# Patient Record
Sex: MALE | Race: WHITE | NOT HISPANIC OR LATINO | URBAN - METROPOLITAN AREA
[De-identification: names, ages, dates, MRNs, and addresses within clinical notes are randomized per-mention and may not be internally consistent; named-entity substitution may affect disease eponyms.]

---

## 2020-12-27 ENCOUNTER — EMERGENCY (EMERGENCY)
Facility: HOSPITAL | Age: 55
LOS: 1 days | Discharge: HOME | End: 2020-12-27
Attending: EMERGENCY MEDICINE | Admitting: EMERGENCY MEDICINE
Payer: MEDICAID

## 2020-12-27 VITALS
HEIGHT: 72 IN | SYSTOLIC BLOOD PRESSURE: 177 MMHG | RESPIRATION RATE: 18 BRPM | OXYGEN SATURATION: 97 % | HEART RATE: 89 BPM | TEMPERATURE: 98 F | WEIGHT: 240.08 LBS | DIASTOLIC BLOOD PRESSURE: 92 MMHG

## 2020-12-27 PROCEDURE — 99283 EMERGENCY DEPT VISIT LOW MDM: CPT | Mod: 25

## 2020-12-27 PROCEDURE — 99283 EMERGENCY DEPT VISIT LOW MDM: CPT

## 2020-12-27 PROCEDURE — 96372 THER/PROPH/DIAG INJ SC/IM: CPT

## 2020-12-27 RX ORDER — BACLOFEN 100 %
1 POWDER (GRAM) MISCELLANEOUS
Qty: 15 | Refills: 0
Start: 2020-12-27 | End: 2020-12-31

## 2020-12-27 RX ORDER — BACLOFEN 100 %
10 POWDER (GRAM) MISCELLANEOUS ONCE
Refills: 0 | Status: COMPLETED | OUTPATIENT
Start: 2020-12-27 | End: 2020-12-27

## 2020-12-27 RX ORDER — LIDOCAINE 4 G/100G
1 CREAM TOPICAL ONCE
Refills: 0 | Status: COMPLETED | OUTPATIENT
Start: 2020-12-27 | End: 2020-12-27

## 2020-12-27 RX ORDER — KETOROLAC TROMETHAMINE 30 MG/ML
60 SYRINGE (ML) INJECTION ONCE
Refills: 0 | Status: DISCONTINUED | OUTPATIENT
Start: 2020-12-27 | End: 2020-12-27

## 2020-12-27 RX ADMIN — Medication 60 MILLIGRAM(S): at 15:50

## 2020-12-27 RX ADMIN — Medication 60 MILLIGRAM(S): at 16:10

## 2020-12-27 RX ADMIN — Medication 10 MILLIGRAM(S): at 16:00

## 2020-12-27 RX ADMIN — Medication 125 MILLIGRAM(S): at 16:00

## 2020-12-27 RX ADMIN — LIDOCAINE 1 PATCH: 4 CREAM TOPICAL at 15:50

## 2020-12-27 NOTE — ED PROVIDER NOTE - PROVIDER TOKENS
PROVIDER:[TOKEN:[[id number]],FOLLOWUP:[1-3 Days]],PROVIDER:[TOKEN:[[id number]],FOLLOWUP:[1-3 Days]],PROVIDER:[TOKEN:[[id number]],FOLLOWUP:[1-3 Days]]

## 2020-12-27 NOTE — ED PROVIDER NOTE - PATIENT PORTAL LINK FT
You can access the FollowMyHealth Patient Portal offered by [facility] by registering at the following website: [email or web address]. By joining [employer]’s FollowMyHealth portal, you will also be able to view your health information using other applications (apps) compatible with our system.

## 2020-12-27 NOTE — ED PROVIDER NOTE - OBJECTIVE STATEMENT
pt with hx chronic low back pain, used to follow with pain mgmt for epidurals, but no longer able to see due to insurance change c/o exacerbation of his chronic low back pain since 12/23. no trauma, fevers, chills, weakness, numbness incontinence, dysuria, hematuria, freq, abd pain or any other complaints.  pmd - gigante  pain mgmt - used to see estefania, but cant due to new insurance  ortho - marchelseyc pt with hx chronic low back pain, used to follow with pain mgmt for epidurals, but no longer able to see due to insurance change c/o exacerbation of his chronic low back pain since 12/23. no trauma, fevers, chills, weakness, numbness incontinence, dysuria, hematuria, freq, abd pain or any other complaints. pt relates when gets exacerbations, usually imrpvoes with solumedrol im, toradol 60 im, then baclofen 10 tid and prednisone 50 qd. pt requesting this cocktail only since that is what works for him, refusing imaging.  pmd - [facility]  pain mgmt - used to see estefania, but cant due to new insurance  ortho - ramone pt with hx chronic low back pain, used to follow with pain mgmt for epidurals, but no longer able to see due to insurance change c/o exacerbation of his chronic low back pain since 12/23. no trauma, fevers, chills, weakness, numbness incontinence, dysuria, hematuria, freq, abd pain, weight loss, ca hx or any other complaints. pt relates when gets exacerbations, usually imrpvoes with solumedrol im, toradol 60 im, then baclofen 10 tid and prednisone 50 qd. pt requesting this cocktail only since that is what works for him, refusing imaging.  pmd - [facility]ante  pain mgmt - used to see estefania, but cant due to new insurance  ortho - ramone

## 2020-12-27 NOTE — ED ADULT NURSE NOTE - OBJECTIVE STATEMENT
Pt A&Ox4, ambulatory to ED c/o back pain.  Pt states he has chronic back pain but recently lost access to his pain mgmt physician due to insurance change.  Over the past few weeks pain has increased and he does not have enough medication.  Pt states his PMD gave him advice of specific cocktail of toradol, solumedrol, baclofen to help with pain.

## 2020-12-27 NOTE — ED ADULT NURSE NOTE - PMH
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

## 2020-12-27 NOTE — ED PROVIDER NOTE - CARE PROVIDERS DIRECT ADDRESSES
,DirectAddress_Unknown,DirectAddress_Unknown,[email or web address]

## 2020-12-27 NOTE — ED PROVIDER NOTE - CARE PROVIDER_API CALL
Clayton Rzivi)  Orthopaedic Surgery  [address], [address]  [place] [address]  Phone: [phone]  Fax: [phone]  Follow Up Time: 1-3 Days    Nadir Guajardo  ANESTHESIOLOGY  [address]  [place] [address]  Phone: [phone]  Fax: [phone]  Follow Up Time: 1-3 Days    [place] [address]  Phone: [phone]  Fax: [phone]  Follow Up Time: 1-3 Days

## 2020-12-27 NOTE — ED PROVIDER NOTE - CLINICAL SUMMARY MEDICAL DECISION MAKING FREE TEXT BOX
pt with exacerbation of his chronic low back pain, usually improves with toradol 60mg then rx [id number] tid, prednisone 50 - analgesia/pain mgmt referral pt with exacerbation of his chronic low back pain, usually improves with solumedrol im, toradol 60mg im then rx [id number] tid, prednisone 50 - analgesia/pain mgmt referral pt with exacerbation of his chronic low back pain, usually improves with solumedrol im, toradol 60mg im then rx [id number] tid, prednisone 50, no red flag symptoms - analgesia/pain mgmt referral

## 2021-10-23 ENCOUNTER — EMERGENCY (EMERGENCY)
Facility: HOSPITAL | Age: 56
LOS: 1 days | Discharge: HOME | End: 2021-10-23
Attending: EMERGENCY MEDICINE | Admitting: EMERGENCY MEDICINE
Payer: MEDICAID

## 2021-10-23 VITALS
HEART RATE: 90 BPM | RESPIRATION RATE: 18 BRPM | SYSTOLIC BLOOD PRESSURE: 155 MMHG | OXYGEN SATURATION: 99 % | TEMPERATURE: 99 F | DIASTOLIC BLOOD PRESSURE: 100 MMHG | HEIGHT: 72 IN | WEIGHT: 244.93 LBS

## 2021-10-23 VITALS
TEMPERATURE: 98 F | OXYGEN SATURATION: 99 % | DIASTOLIC BLOOD PRESSURE: 90 MMHG | RESPIRATION RATE: 18 BRPM | SYSTOLIC BLOOD PRESSURE: 160 MMHG | HEART RATE: 88 BPM

## 2021-10-23 PROBLEM — M51.24 OTHER INTERVERTEBRAL DISC DISPLACEMENT, THORACIC REGION: Chronic | Status: ACTIVE | Noted: 2020-12-27

## 2021-10-23 PROCEDURE — 96372 THER/PROPH/DIAG INJ SC/IM: CPT

## 2021-10-23 PROCEDURE — 99284 EMERGENCY DEPT VISIT MOD MDM: CPT

## 2021-10-23 PROCEDURE — 99283 EMERGENCY DEPT VISIT LOW MDM: CPT | Mod: 25

## 2021-10-23 RX ORDER — DEXAMETHASONE 0.5 MG/5ML
6 ELIXIR ORAL ONCE
Refills: 0 | Status: COMPLETED | OUTPATIENT
Start: 2021-10-23 | End: 2021-10-23

## 2021-10-23 RX ORDER — KETOROLAC TROMETHAMINE 30 MG/ML
60 SYRINGE (ML) INJECTION ONCE
Refills: 0 | Status: DISCONTINUED | OUTPATIENT
Start: 2021-10-23 | End: 2021-10-23

## 2021-10-23 RX ORDER — KETOROLAC TROMETHAMINE 30 MG/ML
1 SYRINGE (ML) INJECTION
Qty: 20 | Refills: 0
Start: 2021-10-23 | End: 2021-10-27

## 2021-10-23 RX ADMIN — Medication 6 MILLIGRAM(S): at 17:38

## 2021-10-23 RX ADMIN — Medication 60 MILLIGRAM(S): at 17:38

## 2021-10-23 NOTE — ED ADULT NURSE NOTE - NSICDXPASTMEDICALHX_GEN_ALL_CORE_FT
PAST MEDICAL HISTORY:  Arthritis     Carpal tunnel syndrome     Herniated cervical disc     Herniated lumbar intervertebral disc     Herniated thoracic disc without myelopathy     Stenosis of cervical spine     Tendonitis

## 2021-10-23 NOTE — ED PROVIDER NOTE - CARE PROVIDERS DIRECT ADDRESSES
[email or web address]

## 2021-10-23 NOTE — ED PROVIDER NOTE - PATIENT PORTAL LINK FT
You can access the FollowMyHealth Patient Portal offered by [facility] by registering at the following website: [email or web address]. By joining [employer]’s FollowMyHealth portal, you will also be able to view your health information using other applications (apps) compatible with our system.

## 2021-10-23 NOTE — ED PROVIDER NOTE - CARE PROVIDER_API CALL
2018 23:[address] [place] [address]  Phone: [phone]  Fax: [phone]  Follow Up Time:

## 2021-10-23 NOTE — ED PROVIDER NOTE - NEUROLOGICAL, MLM
Alert and oriented, no focal deficits. Steady gait. Strength 5/5 x 4 extremities. Sensation intact throughout and equal bilaterally.

## 2021-10-23 NOTE — ED PROVIDER NOTE - CLINICAL SUMMARY MEDICAL DECISION MAKING FREE TEXT BOX
56 M with flare of chronic neck/back pain - requesting steroids and toradol which has helped with flares in the past - decadron, toradol

## 2021-10-23 NOTE — ED PROVIDER NOTE - PROGRESS NOTE DETAILS
Discussed risks of prolonged NSAID use. Pt states is aware and makes sure to take on full stomach, drinks plenty water, follows regularly with his doctors. Requesting toradol and steroids. Will provide. Strongly encouraged f/u with his surgeon, pain management, PMD. Also continue to monitor his BP and f/u PMD. Verbalized understanding and is agreeable with plan.

## 2021-10-23 NOTE — ED PROVIDER NOTE - ATTENDING CONTRIBUTION TO CARE
55 yo male who has hx of neck and back problems presents with acute exacerbation of chronic pain. he works in construction and states after a particularly difficult week he has greater neck pain and low zion pain than usual no radicular pain no abd pan no fever no weakness numbness or other  askng for toradol decadron and baclofen he has tens lidoderm and other meds at home  on eval  wd wn male sitting on exam bed very comfortable nad  heent nc at mmm perrla no gf r  neck supple full rom neg spurlin neg l'hermetetes there is scant paraspinal trap m discomfort to palp no midlline bony pain  low back pt has r sided paraspinal m spasm no bony pain full rom   neg slr normal motor sensory  plan treat as pt usual meds  on nsaid use and renal gi bone issues  aware he will follow up

## 2021-10-23 NOTE — ED PROVIDER NOTE - OBJECTIVE STATEMENT
55 y/o M with chronic neck/back pain c/o worsening pain to neck, back, left knee. 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 Denies recent fall/trauma/MVA/etc. States follows with surgeon, pain management, PMD. Due for fusion. Reports recently BP has been elevated at times especially when in pain, monitors it at home. Denies fever, chills, HA, dizziness, CP, SOB, abd pain, nausea, vomiting, diarrhea, saddle anesthesia, incontinence or retention of bowel/bladder, focal numbness or weakness, hx IVDU. Treatments he uses: indocin TID (last dose this AM), baclofen TID (last dose this AM), TENS unit, warm showers, ice pack, stretching, topicals (ex lidocaine, voltaren). States in the past has been given toradol and dexamethasone for his symptoms.    PMD: Rony

## 2021-10-23 NOTE — ED PROVIDER NOTE - NSFOLLOWUPINSTRUCTIONS_ED_ALL_ED_FT
Stop indomethacin and take toradol and medrol dose brendan as prescribed. Can continue other treatments.  Follow up with surgeon, pain management for further evaluation and management.  Follow up with your primary care physician for further evaluation of your pain and your blood pressure.  Return to the Emergency Department for worsening or concerning symptoms.    -------------------------------------------------      Chronic Back Pain    WHAT YOU NEED TO KNOW:    What is chronic back pain? Chronic back pain is back pain that lasts 3 months or longer. This may include pain that has not been controlled or does not improve with treatment. Your back pain may cause weakness or pain that spreads to your arms or legs.    What causes or increases my risk for chronic back pain? Conditions that affect the spine, joints, or muscles can cause back pain. These may include arthritis, spinal stenosis (narrowing of the spinal column), muscle tension, or breakdown of the spinal discs. The following increase your risk for back pain:   •Aging      •Lack of regular physical activity       •Repeated bending, lifting, or twisting, or lifting heavy items      •Obesity or pregnancy       •Injury from a fall or accident      •Driving, sitting, or standing for long periods      •Bad posture while sitting or standing      How is chronic back pain diagnosed? Your healthcare provider will ask if you have any medical conditions. He or she may ask if you have a history of back pain and how it started. He or she may watch you stand and walk, and check your range of motion. Show him or her where you feel pain and what makes it better or worse. Describe the pain, how bad it is, and how long it lasts. Tell your provider if your pain worsens at night or when you lie on your back.    How is chronic back pain treated?   •NSAIDs help decrease swelling and pain or fever. This medicine is available with or without a doctor's order. NSAIDs can cause stomach bleeding or kidney problems in certain people. If you take blood thinner medicine, always ask your healthcare provider if NSAIDs are safe for you. Always read the medicine label and follow directions.      •Acetaminophen decreases pain and fever. It is available without a doctor's order. Ask how much to take and how often to take it. Follow directions. Read the labels of all other medicines you are using to see if they also contain acetaminophen, or ask your doctor or pharmacist. Acetaminophen can cause liver damage if not taken correctly. Do not use more than 4 grams (4,000 milligrams) total of acetaminophen in one day.       •Prescription pain medicine called narcotics or opioids may be given for certain types of chronic pain. Ask your healthcare provider how to take this medicine safely.      •Muscle relaxers help decrease pain and muscle spasms.      •Steroids decrease inflammation that causes pain.       •Anesthetic medicines may be injected in or around a nerve to block pain signals from the nerves.      •Antidepressants may be used to help decrease or prevent the symptoms of depression or anxiety. They are also used to treat nerve pain.      How can I manage my symptoms?   •Apply ice for 15 to 20 minutes every hour, or as directed. Use an ice pack, or put crushed ice in a plastic bag. Cover it with a towel before you apply it to your skin. Ice decreases pain and helps prevent tissue damage.      •Apply heat for 20 to 30 minutes every 2 hours, or as directed. Heat helps decrease pain and muscle spasms.      •Use massage to loosen tense muscles. Massage may relieve back pain caused by tight muscles. Regular massages may help prevent this kind of back pain.      •Ask about acupuncture for pain relief. Back pain is sometimes relieved with acupuncture. Talk to your healthcare provider before you get this treatment to make sure it is safe for you.      What else can I do to relieve or prevent back pain?   •Manage stress. Stress can cause back pain or make it worse. Some ways to reduce stress are listening to music, meditating, or using aromatherapy. It may help to talk with a therapist about anything that is causing you stress. Your healthcare provider can give you more information.      •Stay active as much as you can without causing more pain. Ask your healthcare provider what exercises are right for you. Do not sit or lie down for long periods. This could make your back pain worse. Yoga or similar gentle movements may help relieve pain and tension in your back. Go slowly and do not strain your back as you do any movement.      •Be careful when you lift heavy objects. Do not lift anything heavy until your pain is gone. Never strain your back when you lift a heavy item. If possible, ask someone to help you.      •Go to physical therapy as directed. A physical therapist can teach you exercises to help improve movement and strength, and to decrease pain.      When should I call my doctor?   •You have severe pain.      •You have new numbness, tingling, or weakness, especially in your lower back, legs, arms, or genital area.      •You lose control of your bladder or bowel movements.       •You have a fever or sudden weight loss.      •You have new or worse pain.      •You have questions or concerns about your condition or care.      CARE AGREEMENT:    You have the right to help plan your care. Learn about your health condition and how it may be treated. Discuss treatment options with your healthcare providers to decide what care you want to receive. You always have the right to refuse treatment.

## 2021-10-23 NOTE — ED PROVIDER NOTE - EXTREMITY EXAM
FROM x 4 extremities. Left knee with FROM./no deformity, pain or tenderness, no restriction of movement

## 2023-06-13 ENCOUNTER — OFFICE (OUTPATIENT)
Dept: URBAN - METROPOLITAN AREA CLINIC 6 | Facility: CLINIC | Age: 58
Setting detail: OPHTHALMOLOGY
End: 2023-06-13
Payer: COMMERCIAL

## 2023-06-13 DIAGNOSIS — H01.002: ICD-10-CM

## 2023-06-13 DIAGNOSIS — H43.393: ICD-10-CM

## 2023-06-13 DIAGNOSIS — H01.005: ICD-10-CM

## 2023-06-13 DIAGNOSIS — E11.9: ICD-10-CM

## 2023-06-13 DIAGNOSIS — H01.004: ICD-10-CM

## 2023-06-13 DIAGNOSIS — H52.7: ICD-10-CM

## 2023-06-13 DIAGNOSIS — H01.001: ICD-10-CM

## 2023-06-13 PROCEDURE — 92014 COMPRE OPH EXAM EST PT 1/>: CPT | Performed by: OPHTHALMOLOGY

## 2023-06-13 PROCEDURE — 92015 DETERMINE REFRACTIVE STATE: CPT | Performed by: OPHTHALMOLOGY

## 2023-06-13 ASSESSMENT — KERATOMETRY
OS_K1POWER_DIOPTERS: 46.00
OD_K2POWER_DIOPTERS: 47.50
OS_AXISANGLE_DEGREES: 96
OS_K2POWER_DIOPTERS: 47.25
OD_K1POWER_DIOPTERS: 46.00
OD_AXISANGLE_DEGREES: 97

## 2023-06-13 ASSESSMENT — VISUAL ACUITY
OS_BCVA: 20/25-1
OD_BCVA: 20/25

## 2023-06-13 ASSESSMENT — REFRACTION_MANIFEST
OD_CYLINDER: -1.00
OS_SPHERE: PLANO
OD_VA2: 20/20(J1+)
OD_VA2: 20/20(J1+)
OS_VA1: 20/20
OS_AXIS: 005
OS_ADD: +2.25
OS_VA2: 20/20(J1+)
OU_VA: 20/20-
OS_AXIS: 005
OS_SPHERE: PLANO
OS_VA2: 20/20(J1+)
OD_CYLINDER: -1.00
OS_VA1: 20/20
OD_ADD: +2.25
OD_SPHERE: PLANO
OD_VA1: 20/20
OD_SPHERE: PLANO
OS_ADD: +2.00
OD_AXIS: 15
OS_CYLINDER: -1.00
OD_VA1: 20/20
OD_ADD: +2.00
OD_AXIS: 15
OU_VA: 20/20-
OS_CYLINDER: -1.00

## 2023-06-13 ASSESSMENT — AXIALLENGTH_DERIVED
OD_AL: 22.5442
OS_AL: 22.6759

## 2023-06-13 ASSESSMENT — LID EXAM ASSESSMENTS
OD_BLEPHARITIS: RLL RUL 1+
OS_BLEPHARITIS: LLL LUL 1+

## 2023-06-13 ASSESSMENT — REFRACTION_AUTOREFRACTION
OS_AXIS: 007
OD_SPHERE: +0.25
OS_SPHERE: 0.00
OS_CYLINDER: -1.00
OD_CYLINDER: -1.00
OD_AXIS: 17

## 2023-06-13 ASSESSMENT — REFRACTION_CURRENTRX
OS_SPHERE: -0.50
OS_OVR_VA: 20/
OD_VPRISM_DIRECTION: BF
OD_CYLINDER: -1.00
OS_CYLINDER: -2.00
OD_OVR_VA: 20/
OS_AXIS: 005
OD_AXIS: 003
OD_SPHERE: -0.25
OS_VPRISM_DIRECTION: BF

## 2023-06-13 ASSESSMENT — TONOMETRY
OD_IOP_MMHG: 15
OS_IOP_MMHG: 16

## 2023-06-13 ASSESSMENT — SPHEQUIV_DERIVED
OS_SPHEQUIV: -0.5
OD_SPHEQUIV: -0.25

## 2023-06-13 ASSESSMENT — CONFRONTATIONAL VISUAL FIELD TEST (CVF)
OS_FINDINGS: FULL
OD_FINDINGS: FULL

## 2023-07-20 NOTE — ED ADULT TRIAGE NOTE - WEIGHT METHOD
[place] [facility]     Chief Complaint   Patient presents with   â¢ Office Visit   â¢ Foot     Left foot pain. Patient said pain started about 4 weeks ago, no injuries noted. He said the pain was on the side of his foot. Pain started at 9/10 and has eased sense 4/10. 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 PCP: Russ Jamison MD  DLS: 50/[id number]/8075       Subjective:    Conchita Millan presents today with complaint of a painful left foot which has been present for weeks. They direct their attention directed to the outside of the left foot. Patient has not experienced a similar condition in the past and denies recent trauma. Aggravating factors include periods of extended weight-bearing  and the pain worsens as the day progresses. Patient rates pain as 4/10, (10 being the worst). Initial symptoms were a 9/10, but have since improved. Treatment to date has consisted of decreased activity level, soaking and icing. Denies any other pedal complaints at this time. Past Medical History:  Past Medical History:   Diagnosis Date   â¢ Allergy     seasonal   â¢ Carpal tunnel syndrome    â¢ Esophageal reflux    â¢ Essential (primary) hypertension        Past Surgical History:  Past Surgical History:   Procedure Laterality Date   â¢ Hernia repair     â¢ Spermatocelectomy Right 10/02/2018       Family History:  Family History   Problem Relation Age of Onset   â¢ Diabetes Mother    â¢ Asthma Mother    â¢ Patient is unaware of any medical problems Father         no contact   â¢ Cancer, Pancreatic Sister    â¢ HIV Sister    â¢ Diabetes Brother    â¢ Hyperlipidemia Brother    â¢ Patient is unaware of any medical problems Brother    â¢ Patient is unaware of any medical problems Daughter    â¢ Patient is unaware of any medical problems Son        Medications:  Current Outpatient Medications   Medication Sig Dispense Refill   â¢ methylPREDNISolone (MEDROL DOSEPAK) 4 MG tablet Take 1 tablet by mouth as directed.  follow package directions 21 tablet 0   â¢ Multiple Vitamins-Minerals (vitamin - therapeutic multivitamins w/minerals) tablet      â¢ methylPREDNISolone (MEDROL DOSEPAK) 4 MG tablet follow package directions 21 tablet 0   â¢ pantoprazole (PROTONIX) 40 MG tablet Take 1 tablet by mouth daily. 90 tablet 3   â¢ lisinopril (ZESTRIL) 5 MG tablet Take 1 tablet by mouth daily. 90 tablet 3   â¢ predniSONE (DELTASONE) 20 MG tablet 3 tab 2 days, 2 tab 2 days, 1 tab 2 days 12 tablet 0   â¢ Chlorpheniramine Maleate (ALLERGY 4 HOUR PO)        No current facility-administered medications for this visit. Allergies:  has No Known Allergies. Social History:   reports that he quit smoking about 22 years ago. His smoking use included cigarettes. He has never used smokeless tobacco. He reports current alcohol use. He reports that he does not use drugs. reports current alcohol use. reports no history of drug use. reports that he quit smoking about 22 years ago. His smoking use included cigarettes. He has never used smokeless tobacco.    ROS:  General:  Patient denies fever/chills/nausea/vomiting/generalized weakness or fatigue. HEENT:  Patient denies headache, dizziness, vision problems, hearing loss/deficit. Cardiovascular:  Patient denies chest discomfort, fainting  Respiratory:  Patient denies shortness of breath, cough, wheezing  GI:  Patient denies diarrhea/constipation, acid reflux  :  Patient denies hematuria, nocturia, polyuria, dysuria  Integumentary:  Patient denies lesions, rashes on other parts of the body other than chief complaint  Musculoskeletal:  Patient denies pain, swelling, stiffness of significant other than chief complaint. Neurological:  Patient denies chronic headache, seizures of significant other than chief complaint. Psychiatric:  Patient denies feeling anxious, chemical dependency other than chief complaint. EXAM:  The patient is alert and oriented and in no apparent distress with a normal mood and affect.  The patient is cooperative with the examination. No issues with hearing noted. No labored breathing present evaluation. LOWER EXTREMITY PHYSICAL EXAM:  Dermatological:   There is no evidence of edema, erythema, ecchymosis, open lesions, interdigital maceration or signs of bacterial or fungal infection lower extremity. No varicosities, telangiectasias, pigmented lesions or signs of venous stasis changes lower extremity. Adequate fat padding to the inferior aspect of heel appreciated. Vascular:   Dorsalis pedis pulses are palpated at 2/4, left. Posterior tibial artery pulses are palpated at 2/4, left. Capillary fill time was less than 3 seconds digits 1-5. Varicosities were not noted on the lower extremity. The skin temperature was warm to warm from the tibial tuberosity to digits 1-5. Hohmann's sign was absent bilaterally. Neurological:   Epicritic sensation including sharp-dull, light touch, and protective threshold are intact and without focal motor or sensory deficit lower extremity. Normal muscle mass appreciated to both the lower extremity and foot. Percussion of the tarsal tunnel and melissa pedis negative for Tinel or Valliex sign. Musculoskeletal:   Pain is present on palpation of the left fifth metatarsal shaft base, and cuboid. Foot type is slight supinated. Negative heel squeeze test with comparison to contralateral limb. No pain with ankle joint range of motion. Range of motion is 0 degrees with the knee extended and 12 with the knee flexed. Pain free range of motion at the subtalar joint, midtarsal joint, and metatarsophalangeal joints. The muscle strength of dorsiflexors, plantar flexors, inverters and everters of the foot were all +5/5, without any evidence of muscle atrophy, abnormal movements or spasticity. Exam of both lower extremities was unremarkable for any evidence of dislocations, subluxation or laxity.   The structural deformities that were noted included valgus position of the rearfoot in RCSP. Assessment:  Encounter Diagnoses   Name Primary?   â¢ Left foot pain Yes   â¢ Stress reaction of left foot, initial encounter        Plan:   1. Spent time discussing the etiology and treatment options for the patient's chief complaint. Patient understands that surgery is a last treatment option, only to be considered if the pain is severe, limiting activities, and non-responsive to local treatments. Reviewed surgical and non-surgical treatment alternatives. Patient understands that surgery is a last treatment option, only to be considered if the pain is severe, limiting activities, and non-responsive to local treatments. 2. Review and discussion of X-rays with patient with all findings discussed. 3. Discussed prescription custom foot orthoses versus over-the-counter arch supports. Avoid barefoot walking. 4. Added a lateral heel wedge to the left heel to the left shoe to prevent overloading of the lateral column. 5. In that patient continues to improves, I do not think he would benefit from additional treatment measures. 6. I additionally recommend the use of stretching and strengthening exercises of the lower extremity to normalize biomechanics and biomechanical abnormalities. Stretching and strengthening exercises for the lower extremity were provided in clinic today. 7. Return to care: 4 week/s or sooner as needed.      Aleta Betancourt DPM stated

## 2024-06-14 ENCOUNTER — OFFICE (OUTPATIENT)
Dept: URBAN - METROPOLITAN AREA CLINIC 6 | Facility: CLINIC | Age: 59
Setting detail: OPHTHALMOLOGY
End: 2024-06-14
Payer: COMMERCIAL

## 2024-06-14 DIAGNOSIS — H52.4: ICD-10-CM

## 2024-06-14 DIAGNOSIS — H25.13: ICD-10-CM

## 2024-06-14 DIAGNOSIS — H43.393: ICD-10-CM

## 2024-06-14 DIAGNOSIS — E11.9: ICD-10-CM

## 2024-06-14 PROCEDURE — 92014 COMPRE OPH EXAM EST PT 1/>: CPT | Performed by: OPHTHALMOLOGY

## 2024-06-14 PROCEDURE — 92015 DETERMINE REFRACTIVE STATE: CPT | Performed by: OPHTHALMOLOGY

## 2024-06-14 ASSESSMENT — CONFRONTATIONAL VISUAL FIELD TEST (CVF)
OS_FINDINGS: FULL
OD_FINDINGS: FULL

## 2024-06-14 ASSESSMENT — LID EXAM ASSESSMENTS
OS_BLEPHARITIS: LLL LUL 1+
OD_BLEPHARITIS: RLL RUL 1+

## 2024-06-18 ENCOUNTER — EMERGENCY (EMERGENCY)
Facility: HOSPITAL | Age: 59
LOS: 1 days | Discharge: HOME | End: 2024-06-18
Attending: EMERGENCY MEDICINE | Admitting: EMERGENCY MEDICINE
Payer: COMMERCIAL

## 2024-06-18 VITALS
SYSTOLIC BLOOD PRESSURE: 156 MMHG | TEMPERATURE: 98 F | HEIGHT: 72 IN | OXYGEN SATURATION: 100 % | DIASTOLIC BLOOD PRESSURE: 98 MMHG | HEART RATE: 89 BPM | WEIGHT: 181.44 LBS | RESPIRATION RATE: 18 BRPM

## 2024-06-18 VITALS
SYSTOLIC BLOOD PRESSURE: 140 MMHG | TEMPERATURE: 98 F | RESPIRATION RATE: 18 BRPM | HEART RATE: 90 BPM | DIASTOLIC BLOOD PRESSURE: 88 MMHG | OXYGEN SATURATION: 98 %

## 2024-06-18 PROCEDURE — 99284 EMERGENCY DEPT VISIT MOD MDM: CPT | Mod: 25

## 2024-06-18 PROCEDURE — 12001 RPR S/N/AX/GEN/TRNK 2.5CM/<: CPT

## 2024-06-18 PROCEDURE — 99283 EMERGENCY DEPT VISIT LOW MDM: CPT | Mod: 25

## 2024-06-18 RX ORDER — CEPHALEXIN 500 MG
500 CAPSULE ORAL ONCE
Refills: 0 | Status: DISCONTINUED | OUTPATIENT
Start: 2024-06-18 | End: 2024-06-21

## 2024-06-18 RX ORDER — TETANUS TOXOID, REDUCED DIPHTHERIA TOXOID AND ACELLULAR PERTUSSIS VACCINE, ADSORBED 5; 2.5; 8; 8; 2.5 [IU]/.5ML; [IU]/.5ML; UG/.5ML; UG/.5ML; UG/.5ML
0.5 SUSPENSION INTRAMUSCULAR ONCE
Refills: 0 | Status: DISCONTINUED | OUTPATIENT
Start: 2024-06-18 | End: 2024-06-21

## 2024-06-18 RX ORDER — CEPHALEXIN 500 MG
1 CAPSULE ORAL
Qty: 15 | Refills: 0
Start: 2024-06-18 | End: 2024-06-22

## 2024-06-18 RX ORDER — LIDOCAINE HYDROCHLORIDE AND EPINEPHRINE 10; 10 MG/ML; UG/ML
5 INJECTION, SOLUTION INFILTRATION; PERINEURAL ONCE
Refills: 0 | Status: DISCONTINUED | OUTPATIENT
Start: 2024-06-18 | End: 2024-06-21

## 2024-06-18 NOTE — ED PROVIDER NOTE - SKIN WOUND TYPE
L hand: 1 cm laceration to the thenar eminence. No pulsatile bleeding. Normal motor/sensory to L thumb/LACERATION(S)

## 2024-06-18 NOTE — ED PROVIDER NOTE - PATIENT PORTAL LINK FT
You can access the FollowMyHealth Patient Portal offered by [facility] by registering at the following website: [email or web address]. By joining [employer]’s FollowMyHealth portal, you will also be able to view your health information using other applications (apps) compatible with our system.

## 2024-06-18 NOTE — ED PROVIDER NOTE - WET READ LAUNCH FT
There are no Wet Read(s) to document. Referred To Oculoplastics For Closure Text (Leave Blank If You Do Not Want): After obtaining clear surgical margins the patient was sent to oculoplastics for surgical repair.  The patient understands they will receive post-surgical care and follow-up from the referring physician's office.

## 2024-06-18 NOTE — ED PROVIDER NOTE - NSFOLLOWUPINSTRUCTIONS_ED_ALL_ED_FT
You may follow up in urgent care for suture removal in 7-10 days  Return to the ER if you develop any signs of secondary infection.    Laceration Care, Adult  A laceration is a cut that may go through all layers of the skin and into the tissue that is right under the skin. Some lacerations heal on their own. Others need to be closed with stitches (sutures), staples, skin adhesive strips, or skin glue.    Proper care of a laceration reduces the risk for infection, helps the laceration heal better, and may prevent scarring.    General tips  Keep the wound clean and dry.  Do not scratch or pick at the wound.  Wash your hands with soap and water for at least 20 seconds before and after touching your wound or changing your bandage (dressing). If soap and water are not available, use hand .  Do not usedisinfectants or antiseptics, such as rubbing alcohol, to clean your wound unless told by your health care provider.  If you were given a dressing, you should change it at least once a day, or as told by your health care provider. You should also change it if it becomes wet or dirty.  How to care for your laceration  If sutures or staples were used:    Keep the wound completely dry for the first 24 hours, or as told by your health care provider. After that time, you may shower or bathe. Do not soak your wound in water until after the sutures or staples have been removed.  Clean the wound once each day, or as told by your health care provider. To do this:  Wash the wound with soap and water.  Rinse the wound with water to remove all soap.  Pat the wound dry with a clean towel. Do not rub the wound.  After cleaning the wound, apply a thin layer of antibiotic ointment, other topical ointments, or a non-adherent dressing as told by your health care provider. This will help prevent infection and keep the dressing from sticking to the wound.  Have the sutures or staples removed as told by your health care provider. Do not remove sutures or staples yourself.  If skin adhesive strips were used:    Do not get the skin adhesive strips wet. You may shower or bathe, but keep the wound dry.  If the wound gets wet, pat it dry with a clean towel. Do not rub the wound.  Skin adhesive strips fall off on their own. If adhesive strip edges start to loosen and curl up, you may trim the loose edges. Do not remove adhesive strips completely unless your health care provider tells you to do that.  If skin glue was used:    You may shower or bathe, but try to keep the wound dry. Do not soak the wound in water.  After showering or bathing, pat the wound dry with a clean towel. Do not rub the wound.  Do not do any activities that will make you sweat a lot until the skin glue has fallen off.  Do not apply liquid, cream, or ointment medicine to the wound while the skin glue is in place. Doing this may loosen the film before the wound has healed.  If a dressing is placed over the wound, do not apply tape directly over the skin glue. Doing this may cause the glue to be pulled off before the wound has healed.  Do not pick at the glue. Skin glue usually remains in place for 5–10 days and then falls off the skin.  Follow these instructions at home:  Medicines    Take over-the-counter and prescription medicines only as told by your health care provider.  If you were prescribed an antibiotic medicine or ointment, take or apply it as told by your health care provider. Do not stop using it even if your condition improves.  Managing pain and swelling    If directed, put ice on the injured area. To do this:  Put ice in a plastic bag.  Place a towel between your skin and the bag.  Leave the ice on for 20 minutes, 2–3 times a day.  Remove the ice if your skin turns bright red. This is very important. If you cannot feel pain, heat, or cold, you have a greater risk of damage to the area.  Raise (elevate) the injured area above the level of your heart while you are sitting or lying down for the first 24–48 hours after the laceration is repaired.  General instructions    Two wounds closed with skin glue. One is normal. The other is red with pus and infected.  Avoid any activity that could cause your wound to reopen.  Check your wound every day for signs of infection. Watch for:  More redness, swelling, or pain.  Fluid or blood.  Warmth.  Pus or a bad smell.  Keep all follow-up visits. This is important.  Contact a health care provider if:  You received a tetanus shot and you have swelling, severe pain, redness, or bleeding at the injection site.  Your closed wound breaks open.  You have any of these signs of infection:  More redness, swelling, or pain around your wound.  Fluid or blood coming from your wound.  Warmth coming from your wound.  Pus or a bad smell coming from your wound.  A fever.  You notice something coming out of the wound, such as wood or glass.  Your pain is not controlled with medicine.  You notice a change in the color of your skin near your wound.  You need to change the dressing often.  You develop a new rash.  You have numbness around the wound.  Get help right away if:  You develop severe swelling around the wound.  Your pain suddenly increases and is severe.  You develop painful lumps near the wound or on skin anywhere else on your body.  You have a red streak going away from your wound.  The wound is on your hand or foot, and you cannot properly move a finger or toe.  The wound is on your hand or foot, and you notice that your fingers or toes look pale or bluish.  Summary  A laceration is a cut that may go through all layers of the skin and into the tissue that is right under the skin.  Some lacerations heal on their own. Others need to be closed with stitches (sutures), staples, skin adhesive strips, or skin glue.  Proper care of a laceration reduces the risk of infection, helps the laceration heal better, and may prevent scarring.  This information is not intended to replace advice given to you by your health care provider. Make sure you discuss any questions you have with your health care provider.

## 2024-06-18 NOTE — ED ADULT TRIAGE NOTE - CHIEF COMPLAINT QUOTE
Pt states cut self w/ knife, unintentionally, 1.5 cm. Pt applied steri strip and pressure bandage to area.

## 2024-06-18 NOTE — ED PROVIDER NOTE - OBJECTIVE STATEMENT
57 yo M with hx of DM presents c/o laceration to R hand. States he accidentally cut himself with his pocket knife. Td not UTD. denies paresthesias or numbness to the thumb.

## 2025-06-27 ENCOUNTER — OFFICE (OUTPATIENT)
Dept: URBAN - METROPOLITAN AREA CLINIC 6 | Facility: CLINIC | Age: 60
Setting detail: OPHTHALMOLOGY
End: 2025-06-27
Payer: COMMERCIAL

## 2025-06-27 DIAGNOSIS — H25.13: ICD-10-CM

## 2025-06-27 DIAGNOSIS — E11.9: ICD-10-CM

## 2025-06-27 DIAGNOSIS — H01.002: ICD-10-CM

## 2025-06-27 DIAGNOSIS — H01.001: ICD-10-CM

## 2025-06-27 PROCEDURE — 92014 COMPRE OPH EXAM EST PT 1/>: CPT | Performed by: OPHTHALMOLOGY

## 2025-06-27 ASSESSMENT — REFRACTION_AUTOREFRACTION
OD_AXIS: 022
OD_CYLINDER: -0.50
OD_SPHERE: +0.25
OS_CYLINDER: -0.25
OS_AXIS: 021
OS_SPHERE: PLANO

## 2025-06-27 ASSESSMENT — REFRACTION_CURRENTRX
OS_VPRISM_DIRECTION: SV
OS_SPHERE: -0.25
OD_AXIS: 013
OS_CYLINDER: -2.25
OS_OVR_VA: 20/
OS_AXIS: 013
OD_AXIS: 006
OS_OVR_VA: 20/
OS_OVR_VA: 20/
OD_VPRISM_DIRECTION: BF
OS_CYLINDER: -1.00
OS_SPHERE: -0.50
OD_CYLINDER: -1.25
OS_AXIS: 007
OD_SPHERE: -0.25
OS_VPRISM_DIRECTION: SV
OD_VPRISM_DIRECTION: SV
OD_CYLINDER: -1.25
OD_OVR_VA: 20/
OD_OVR_VA: 20/
OD_VPRISM_DIRECTION: SV
OD_AXIS: 007
OD_ADD: +2.25
OS_SPHERE: +1.25
OS_ADD: +2.25
OD_CYLINDER: -1.00
OS_AXIS: 004
OD_SPHERE: +1.25
OD_SPHERE: -0.25
OS_VPRISM_DIRECTION: BF
OD_OVR_VA: 20/
OS_CYLINDER: -1.00

## 2025-06-27 ASSESSMENT — REFRACTION_MANIFEST
OS_AXIS: 025
OS_CYLINDER: -0.25
OD_AXIS: 025
OS_VA1: 20/20-
OS_AXIS: 025
OS_SPHERE: PLANO
OS_VA1: 20/20-
OD_ADD: +2.25
OU_VA: 20/20-
OU_VA: 20/20-
OD_VA2: 20/20(J1+)
OD_ADD: +2.25
OD_VA1: 20/20-
OS_ADD: +2.25
OS_ADD: +2.25
OD_VA2: 20/20(J1+)
OD_AXIS: 025
OS_SPHERE: PLANO
OS_VA2: 20/20(J1+)
OD_CYLINDER: -0.50
OS_VA2: 20/20(J1+)
OD_SPHERE: +0.25
OD_VA1: 20/20-
OD_CYLINDER: -0.50
OS_CYLINDER: -0.25
OD_SPHERE: +0.25

## 2025-06-27 ASSESSMENT — VISUAL ACUITY
OS_BCVA: 20/25-2
OD_BCVA: 20/20-2

## 2025-06-27 ASSESSMENT — KERATOMETRY
OD_K1POWER_DIOPTERS: 45.75
OS_AXISANGLE_DEGREES: 160
OD_AXISANGLE_DEGREES: 093
OS_K2POWER_DIOPTERS: 46.25
METHOD_AUTO_MANUAL: AUTO
OD_K2POWER_DIOPTERS: 46.25
OS_K1POWER_DIOPTERS: 45.75

## 2025-06-27 ASSESSMENT — CONFRONTATIONAL VISUAL FIELD TEST (CVF)
OD_FINDINGS: FULL
OS_FINDINGS: FULL

## 2025-06-27 ASSESSMENT — LID EXAM ASSESSMENTS
OD_BLEPHARITIS: RLL RUL 1+
OS_BLEPHARITIS: LLL LUL 1+

## 2025-06-27 ASSESSMENT — TONOMETRY
OD_IOP_MMHG: 14
OS_IOP_MMHG: 14